# Patient Record
Sex: MALE | Race: WHITE | Employment: FULL TIME | URBAN - METROPOLITAN AREA
[De-identification: names, ages, dates, MRNs, and addresses within clinical notes are randomized per-mention and may not be internally consistent; named-entity substitution may affect disease eponyms.]

---

## 2017-05-31 RX ORDER — OLMESARTAN MEDOXOMIL AND HYDROCHLOROTHIAZIDE 40; 25 MG/1; MG/1
TABLET, FILM COATED ORAL
Qty: 14 TABLET | Refills: 0 | Status: SHIPPED | OUTPATIENT
Start: 2017-05-31 | End: 2017-07-11

## 2017-06-13 DIAGNOSIS — M10.9 GOUTY ARTHROPATHY: Primary | ICD-10-CM

## 2017-06-14 ENCOUNTER — TELEPHONE (OUTPATIENT)
Dept: FAMILY MEDICINE CLINIC | Facility: CLINIC | Age: 67
End: 2017-06-14

## 2017-06-14 DIAGNOSIS — E03.9 HYPOTHYROIDISM, UNSPECIFIED TYPE: ICD-10-CM

## 2017-06-14 DIAGNOSIS — R73.01 IMPAIRED FASTING GLUCOSE: Primary | ICD-10-CM

## 2017-06-14 DIAGNOSIS — E78.5 HYPERLIPIDEMIA, UNSPECIFIED HYPERLIPIDEMIA TYPE: ICD-10-CM

## 2017-06-14 DIAGNOSIS — N18.9 CHRONIC KIDNEY DISEASE, UNSPECIFIED CKD STAGE: ICD-10-CM

## 2017-06-14 RX ORDER — ALLOPURINOL 300 MG/1
TABLET ORAL
Qty: 90 TABLET | Refills: 0 | Status: SHIPPED | OUTPATIENT
Start: 2017-06-14 | End: 2017-09-27

## 2017-06-14 NOTE — TELEPHONE ENCOUNTER
Can orders at [facility] be re-entered please?  The ones in system have  and pt want to go for appt scheduled w/Dr Singh on 17

## 2017-06-14 NOTE — TELEPHONE ENCOUNTER
Pt has appt scheduled with Dr Silvio Briggs on 6/26/17. Pt is overdue for labs. What labs do you want to order? Routed to Dr Silvio Briggs.

## 2017-06-14 NOTE — TELEPHONE ENCOUNTER
LOV 9/21/16. Future Appointments  Date Time Provider Sky Chisholm   6/26/2017 12:00 PM Nabila Singh,  [facility] 3 [facility]     Refill request for Allopurinol. Routed to [facility].

## 2017-06-14 NOTE — TELEPHONE ENCOUNTER
Please order CMP, lipid panel, TSH w/reflex, and Hgb A1c under Dr Kyung Calhoun prior to his upcoming appintment.

## 2017-06-15 DIAGNOSIS — M10.9 GOUTY ARTHROPATHY: Primary | ICD-10-CM

## 2017-07-07 ENCOUNTER — LAB ENCOUNTER (OUTPATIENT)
Dept: LAB | Age: 67
End: 2017-07-07
Attending: FAMILY MEDICINE
Payer: COMMERCIAL

## 2017-07-07 DIAGNOSIS — M10.9 GOUTY ARTHROPATHY: ICD-10-CM

## 2017-07-07 DIAGNOSIS — E03.9 HYPOTHYROIDISM, UNSPECIFIED TYPE: ICD-10-CM

## 2017-07-07 DIAGNOSIS — N18.9 CHRONIC KIDNEY DISEASE, UNSPECIFIED CKD STAGE: ICD-10-CM

## 2017-07-07 DIAGNOSIS — E78.5 HYPERLIPIDEMIA, UNSPECIFIED HYPERLIPIDEMIA TYPE: ICD-10-CM

## 2017-07-07 DIAGNOSIS — R73.01 IMPAIRED FASTING GLUCOSE: ICD-10-CM

## 2017-07-07 LAB
ALBUMIN SERPL-MCNC: 4.3 G/DL (ref 3.5–4.8)
ALP LIVER SERPL-CCNC: 51 U/L (ref 45–117)
ALT SERPL-CCNC: 27 U/L (ref 17–63)
AST SERPL-CCNC: 20 U/L (ref 15–41)
BILIRUB SERPL-MCNC: 0.8 MG/DL (ref 0.1–2)
BUN BLD-MCNC: 41 MG/DL (ref 8–20)
CALCIUM BLD-MCNC: 9.2 MG/DL (ref 8.3–10.3)
CHLORIDE: 102 MMOL/L (ref 101–111)
CHOLEST SMN-MCNC: 255 MG/DL (ref ?–200)
CO2: 26 MMOL/L (ref 22–32)
CREAT BLD-MCNC: 1.52 MG/DL (ref 0.7–1.3)
EST. AVERAGE GLUCOSE BLD GHB EST-MCNC: 131 MG/DL (ref 68–126)
FREE T4: 0.6 NG/DL (ref 0.9–1.8)
GLUCOSE BLD-MCNC: 96 MG/DL (ref 70–99)
HBA1C MFR BLD HPLC: 6.2 % (ref ?–5.7)
HDLC SERPL-MCNC: 60 MG/DL (ref 45–?)
HDLC SERPL: 4.25 {RATIO} (ref ?–4.97)
LDLC SERPL CALC-MCNC: 146 MG/DL (ref ?–130)
M PROTEIN MFR SERPL ELPH: 7.9 G/DL (ref 6.1–8.3)
NONHDLC SERPL-MCNC: 195 MG/DL (ref ?–130)
POTASSIUM SERPL-SCNC: 4.3 MMOL/L (ref 3.6–5.1)
SODIUM SERPL-SCNC: 137 MMOL/L (ref 136–144)
TRIGLYCERIDES: 244 MG/DL (ref ?–150)
TSI SER-ACNC: 8.87 MIU/ML (ref 0.35–5.5)
URIC ACID: 6.4 MG/DL (ref 2.4–8.7)
VLDL: 49 MG/DL (ref 5–40)

## 2017-07-07 PROCEDURE — 36415 COLL VENOUS BLD VENIPUNCTURE: CPT | Performed by: FAMILY MEDICINE

## 2017-07-11 ENCOUNTER — OFFICE VISIT (OUTPATIENT)
Dept: FAMILY MEDICINE CLINIC | Facility: CLINIC | Age: 67
End: 2017-07-11

## 2017-07-11 VITALS
DIASTOLIC BLOOD PRESSURE: 78 MMHG | RESPIRATION RATE: 16 BRPM | SYSTOLIC BLOOD PRESSURE: 148 MMHG | BODY MASS INDEX: 32.41 KG/M2 | HEART RATE: 76 BPM | HEIGHT: 70.2 IN | WEIGHT: 226.38 LBS

## 2017-07-11 DIAGNOSIS — N18.30 STAGE 3 CHRONIC KIDNEY DISEASE (HCC): ICD-10-CM

## 2017-07-11 DIAGNOSIS — E03.9 HYPOTHYROIDISM, UNSPECIFIED TYPE: Primary | ICD-10-CM

## 2017-07-11 DIAGNOSIS — E78.2 MIXED HYPERLIPIDEMIA: ICD-10-CM

## 2017-07-11 DIAGNOSIS — R73.03 PREDIABETES: ICD-10-CM

## 2017-07-11 DIAGNOSIS — L98.0 PYOGENIC GRANULOMA OF SKIN: ICD-10-CM

## 2017-07-11 DIAGNOSIS — I10 ESSENTIAL HYPERTENSION: ICD-10-CM

## 2017-07-11 PROCEDURE — 99214 OFFICE O/P EST MOD 30 MIN: CPT | Performed by: FAMILY MEDICINE

## 2017-07-11 RX ORDER — LEVOTHYROXINE SODIUM 0.1 MG/1
100 TABLET ORAL DAILY
Qty: 60 TABLET | Refills: 1 | Status: SHIPPED | OUTPATIENT
Start: 2017-07-11 | End: 2017-10-29

## 2017-07-11 RX ORDER — LOSARTAN POTASSIUM AND HYDROCHLOROTHIAZIDE 12.5; 5 MG/1; MG/1
1 TABLET ORAL DAILY
Qty: 60 TABLET | Refills: 1 | Status: SHIPPED | OUTPATIENT
Start: 2017-07-11 | End: 2017-10-29

## 2017-07-11 NOTE — PROGRESS NOTES
Chief Complaint:  Patient presents with:  Test Results: labs review  Blood Pressure: insurance will no longer cover Benicar- needd alternative  Thyroid Problem: has not been taking medication over a year      HPI:  This is a 69year old male patient presen tablet Rfl: 1   ALLOPURINOL 300 MG Oral Tab TAKE 1 TABLET BY MOUTH DAILY Disp: 90 tablet Rfl: 0   Nebivolol HCl (BYSTOLIC) 10 MG Oral Tab Take 1 tablet (10 mg total) by mouth once daily.  Disp: 90 tablet Rfl: 3   Aspirin 81 MG Oral Tab Take 81 mg by mouth d decrease to 100mcg and recheck in 6-8 weeks. -     Levothyroxine Sodium 100 MCG Oral Tab; Take 1 tablet (100 mcg total) by mouth daily.  -     TSH+FREE T4; Future    Pyogenic granuloma of skin  On 3rd digit of L hand.  Differential includes mucinous cyst.

## 2017-08-03 DIAGNOSIS — Z13.220 SCREENING FOR LIPOID DISORDERS: ICD-10-CM

## 2017-08-07 RX ORDER — NEBIVOLOL 10 MG/1
10 TABLET ORAL
Qty: 90 TABLET | Refills: 3 | Status: SHIPPED | OUTPATIENT
Start: 2017-08-07 | End: 2018-07-23

## 2017-08-07 NOTE — TELEPHONE ENCOUNTER
From: Colletta Pilling  Sent: 8/3/2017 2:50 PM CDT  Subject: Medication Renewal Request    Barbara Desirele would like a refill of the following medications:  Nebivolol HCl (BYSTOLIC) 10 MG Oral Tab Raffi Schaefer PA-C]    Preferred pharmacy: [address] [place] [address]

## 2017-09-27 DIAGNOSIS — M10.9 GOUTY ARTHROPATHY: ICD-10-CM

## 2017-09-28 RX ORDER — ALLOPURINOL 300 MG/1
TABLET ORAL
Qty: 90 TABLET | Refills: 0 | Status: SHIPPED | OUTPATIENT
Start: 2017-09-28 | End: 2018-01-31

## 2017-10-29 DIAGNOSIS — E03.9 HYPOTHYROIDISM, UNSPECIFIED TYPE: ICD-10-CM

## 2017-10-29 DIAGNOSIS — I10 ESSENTIAL HYPERTENSION: ICD-10-CM

## 2017-10-30 RX ORDER — LOSARTAN POTASSIUM AND HYDROCHLOROTHIAZIDE 12.5; 5 MG/1; MG/1
1 TABLET ORAL DAILY
Qty: 60 TABLET | Refills: 2 | Status: SHIPPED | OUTPATIENT
Start: 2017-10-30 | End: 2018-06-23

## 2017-10-30 RX ORDER — LEVOTHYROXINE SODIUM 0.1 MG/1
TABLET ORAL
Qty: 60 TABLET | Refills: 2 | Status: SHIPPED | OUTPATIENT
Start: 2017-10-30 | End: 2018-04-03

## 2018-01-31 DIAGNOSIS — M10.9 GOUTY ARTHROPATHY: ICD-10-CM

## 2018-01-31 RX ORDER — ALLOPURINOL 300 MG/1
TABLET ORAL
Qty: 30 TABLET | Refills: 0 | Status: SHIPPED | OUTPATIENT
Start: 2018-01-31 | End: 2018-03-24

## 2018-01-31 NOTE — TELEPHONE ENCOUNTER
Refill request sent from pharmacy for Allopurinol. LOV 07/11/17 and labs 07/07/17. Will you approve ? Routed to Dr José Gregory.

## 2018-03-24 DIAGNOSIS — M10.9 GOUTY ARTHROPATHY: ICD-10-CM

## 2018-03-26 NOTE — TELEPHONE ENCOUNTER
LOV 7/11/17 and at that time, pt was advised to follow up in 8 weeks.     Future Appointments  Date Time Provider Sky Chisholm   4/3/2018 8:00 AM Nabila Singh,  [facility] 3 [facility] Nick        Refill request for:      Pending Prescriptions Disp Refills

## 2018-03-28 ENCOUNTER — APPOINTMENT (OUTPATIENT)
Dept: LAB | Age: 68
End: 2018-03-28
Attending: FAMILY MEDICINE
Payer: COMMERCIAL

## 2018-03-28 DIAGNOSIS — I10 ESSENTIAL HYPERTENSION: ICD-10-CM

## 2018-03-28 DIAGNOSIS — E03.9 HYPOTHYROIDISM, UNSPECIFIED TYPE: ICD-10-CM

## 2018-03-28 DIAGNOSIS — N18.30 STAGE 3 CHRONIC KIDNEY DISEASE (HCC): ICD-10-CM

## 2018-03-28 DIAGNOSIS — M10.9 GOUTY ARTHROPATHY: ICD-10-CM

## 2018-03-28 PROCEDURE — 84443 ASSAY THYROID STIM HORMONE: CPT

## 2018-03-28 PROCEDURE — 84550 ASSAY OF BLOOD/URIC ACID: CPT

## 2018-03-28 PROCEDURE — 36415 COLL VENOUS BLD VENIPUNCTURE: CPT

## 2018-03-28 PROCEDURE — 84439 ASSAY OF FREE THYROXINE: CPT

## 2018-03-28 PROCEDURE — 80048 BASIC METABOLIC PNL TOTAL CA: CPT

## 2018-03-28 RX ORDER — ALLOPURINOL 300 MG/1
300 TABLET ORAL
Qty: 30 TABLET | Refills: 0 | Status: CANCELLED
Start: 2018-03-28

## 2018-03-28 NOTE — TELEPHONE ENCOUNTER
From: Latosha Obrien  Sent: 3/28/2018 5:38 AM CDT  Subject: Medication Renewal Request    Caryn Brian.  Deepti Diaz would like a refill of the following medications:     ALLOPURINOL 300 MG Oral Tab Cecilia Encarnacion PA-C]    Preferred pharmacy: [place] [phone]

## 2018-03-30 RX ORDER — ALLOPURINOL 300 MG/1
TABLET ORAL
Qty: 30 TABLET | Refills: 0 | Status: SHIPPED | OUTPATIENT
Start: 2018-03-30 | End: 2018-04-25

## 2018-03-30 NOTE — TELEPHONE ENCOUNTER
ALLOPURINOL 300MG TABLETS  Will file in chart as: ALLOPURINOL 300 MG Oral Tab  TAKE 1 TABLET BY MOUTH DAILY       Disp: 30 tablet Refills: 0    Class: Normal Start: 3/26/2018   For: Gouty arthropathy  Originally ordered: 2 years ago by Emiliano Cisse MD

## 2018-04-03 ENCOUNTER — OFFICE VISIT (OUTPATIENT)
Dept: FAMILY MEDICINE CLINIC | Facility: CLINIC | Age: 68
End: 2018-04-03

## 2018-04-03 VITALS
BODY MASS INDEX: 31.73 KG/M2 | DIASTOLIC BLOOD PRESSURE: 74 MMHG | WEIGHT: 229.19 LBS | RESPIRATION RATE: 14 BRPM | HEART RATE: 64 BPM | HEIGHT: 71.2 IN | TEMPERATURE: 98 F | SYSTOLIC BLOOD PRESSURE: 140 MMHG

## 2018-04-03 DIAGNOSIS — E87.1 HYPONATREMIA: ICD-10-CM

## 2018-04-03 DIAGNOSIS — E78.2 MIXED HYPERLIPIDEMIA: ICD-10-CM

## 2018-04-03 DIAGNOSIS — N18.2 STAGE 2 CHRONIC KIDNEY DISEASE: ICD-10-CM

## 2018-04-03 DIAGNOSIS — E03.9 HYPOTHYROIDISM, UNSPECIFIED TYPE: Primary | ICD-10-CM

## 2018-04-03 DIAGNOSIS — Z00.00 ROUTINE HEALTH MAINTENANCE: ICD-10-CM

## 2018-04-03 DIAGNOSIS — M10.9 GOUTY ARTHROPATHY: ICD-10-CM

## 2018-04-03 DIAGNOSIS — R73.03 PREDIABETES: ICD-10-CM

## 2018-04-03 DIAGNOSIS — I10 ESSENTIAL HYPERTENSION: ICD-10-CM

## 2018-04-03 PROCEDURE — 99214 OFFICE O/P EST MOD 30 MIN: CPT | Performed by: FAMILY MEDICINE

## 2018-04-03 RX ORDER — LEVOTHYROXINE SODIUM 112 UG/1
112 TABLET ORAL
Qty: 90 TABLET | Refills: 1 | Status: SHIPPED | OUTPATIENT
Start: 2018-04-03 | End: 2018-08-06

## 2018-04-03 NOTE — PROGRESS NOTES
Chief Complaint:  Patient presents with:  Lab Results    HPI:  This is a 77year old male patient presenting for Lab Results  Recently retired. Plans to move to [place]. Hypothyroidism, unspecified type  Last TSH 8, now 2.47. Notes that T4 is a little low.  I HCl (BYSTOLIC) 10 MG Oral Tab Take 1 tablet (10 mg total) by mouth once daily. Disp: 90 tablet Rfl: 3   Aspirin 81 MG Oral Tab Take 81 mg by mouth daily.  Disp:  Rfl:      Allergies:    Testosterone            Swelling  Penicillins             Rash    Comme METABOLIC PANEL (14); Future    Prediabetes  -    Will check HEMOGLOBIN A1C; Future    Routine health maintenance  -     PSA SCREEN; Future    Hyponatremia  -    Will recheck COMP METABOLIC PANEL (14);  Future  -Emphasized importance of hydration    Randy wright

## 2018-04-25 DIAGNOSIS — M10.9 GOUTY ARTHROPATHY: ICD-10-CM

## 2018-04-26 RX ORDER — ALLOPURINOL 300 MG/1
TABLET ORAL
Qty: 90 TABLET | Refills: 1 | Status: SHIPPED | OUTPATIENT
Start: 2018-04-26 | End: 2018-08-06

## 2018-04-26 NOTE — TELEPHONE ENCOUNTER
Refill request sent from pharmacy for Allopurinol. LOV 04/03/18 and labs 03/28/18. Will you approve refill ? Routed to Dr Héctor Ware.

## 2018-06-11 ENCOUNTER — PATIENT OUTREACH (OUTPATIENT)
Dept: FAMILY MEDICINE CLINIC | Facility: CLINIC | Age: 68
End: 2018-06-11

## 2018-06-11 NOTE — PROGRESS NOTES
Please call patient to schedule appt. Last blood pressure was elevated and he is also due for annual wellness exam.  Thanks.

## 2018-06-15 NOTE — PROGRESS NOTES
Patient scheduled physical with Dr. Jose Shah on 6/26/18. Please placed lab orders at [facility] and route back to . Patient would like to be notified once they are placed.

## 2018-06-23 DIAGNOSIS — I10 ESSENTIAL HYPERTENSION: ICD-10-CM

## 2018-06-23 RX ORDER — LOSARTAN POTASSIUM AND HYDROCHLOROTHIAZIDE 12.5; 5 MG/1; MG/1
1 TABLET ORAL DAILY
Qty: 90 TABLET | Refills: 1 | Status: SHIPPED | OUTPATIENT
Start: 2018-06-23 | End: 2018-08-27

## 2018-06-26 ENCOUNTER — TELEPHONE (OUTPATIENT)
Dept: FAMILY MEDICINE CLINIC | Facility: CLINIC | Age: 68
End: 2018-06-26

## 2018-06-26 NOTE — TELEPHONE ENCOUNTER
LM for pt to call our office to reschedule his appt with Dr. Silvio Briggs. He will be charged a no show for today.

## 2018-07-23 DIAGNOSIS — Z13.220 SCREENING FOR LIPOID DISORDERS: ICD-10-CM

## 2018-07-23 RX ORDER — NEBIVOLOL HYDROCHLORIDE 10 MG/1
TABLET ORAL
Qty: 90 TABLET | Refills: 1 | Status: SHIPPED | OUTPATIENT
Start: 2018-07-23

## 2018-08-06 DIAGNOSIS — M10.9 GOUTY ARTHROPATHY: ICD-10-CM

## 2018-08-06 DIAGNOSIS — E03.9 HYPOTHYROIDISM, UNSPECIFIED TYPE: ICD-10-CM

## 2018-08-06 RX ORDER — LEVOTHYROXINE SODIUM 112 UG/1
112 TABLET ORAL
Qty: 90 TABLET | Refills: 1 | Status: SHIPPED | OUTPATIENT
Start: 2018-08-06

## 2018-08-06 NOTE — TELEPHONE ENCOUNTER
Allopurinol refill request sent from Pt via [address] LOV 04/03/18, and last Uric Acid 03/28/18. Will you approve pended order ? Routed to Dr Jenny Roy.

## 2018-08-06 NOTE — TELEPHONE ENCOUNTER
From: June Schneider  Sent: 8/6/2018 8:25 AM CDT  Subject: Medication Renewal Request    Lj Cade.  Kimberly Landry would like a refill of the following medications:     Levothyroxine Sodium 112 MCG Oral Tab TERRENCE Mathew     ALLOPURINOL 300 MG Oral Tab Herrera DUNN

## 2018-08-07 RX ORDER — ALLOPURINOL 300 MG/1
300 TABLET ORAL
Qty: 90 TABLET | Refills: 1 | Status: SHIPPED | OUTPATIENT
Start: 2018-08-07

## 2018-08-27 DIAGNOSIS — I10 ESSENTIAL HYPERTENSION: ICD-10-CM

## 2018-08-28 RX ORDER — LOSARTAN POTASSIUM AND HYDROCHLOROTHIAZIDE 12.5; 5 MG/1; MG/1
1 TABLET ORAL DAILY
Qty: 90 TABLET | Refills: 0 | Status: SHIPPED | OUTPATIENT
Start: 2018-08-28

## 2018-08-28 NOTE — TELEPHONE ENCOUNTER
From: Mireya Cook  Sent: 8/27/2018 4:45 PM CDT  Subject: Medication Renewal Request    Neila Au. Hassan Habermann would like a refill of the following medications:     LOSARTAN POTASSIUM-HCTZ 50-12.5 MG Oral Tab Brand DO Soco]    Preferred pharmacy: [facility] [employer] [address]. [facility], [phone], [phone]

## 2019-03-11 DIAGNOSIS — E03.9 HYPOTHYROIDISM, UNSPECIFIED TYPE: ICD-10-CM

## 2019-03-12 NOTE — TELEPHONE ENCOUNTER
Last thyroid labs were done 3/28/18 and at 4/3/18 appt, pt was advised to recheck TSH in 6-8 weeks. TSH was never repeated. At 4/3/18 appt, it states that pt was moving to [place]. Refill request is from [place].     Left message on answering machine to call triag

## 2019-03-22 RX ORDER — LEVOTHYROXINE SODIUM 112 UG/1
TABLET ORAL
Qty: 90 TABLET | Refills: 0 | OUTPATIENT
Start: 2019-03-22

## 2019-03-22 NOTE — TELEPHONE ENCOUNTER
Spoke to patient, he confirmed is now living in [place] permanently and has a doctor there as well. He gave wrong script bottle to pharmacy and apologizes for any confusion.